# Patient Record
Sex: FEMALE | Race: BLACK OR AFRICAN AMERICAN | NOT HISPANIC OR LATINO | Employment: PART TIME | URBAN - METROPOLITAN AREA
[De-identification: names, ages, dates, MRNs, and addresses within clinical notes are randomized per-mention and may not be internally consistent; named-entity substitution may affect disease eponyms.]

---

## 2018-04-27 ENCOUNTER — HOSPITAL ENCOUNTER (EMERGENCY)
Facility: HOSPITAL | Age: 53
Discharge: HOME | End: 2018-04-27
Attending: EMERGENCY MEDICINE
Payer: COMMERCIAL

## 2018-04-27 VITALS
BODY MASS INDEX: 35.77 KG/M2 | OXYGEN SATURATION: 95 % | HEART RATE: 84 BPM | SYSTOLIC BLOOD PRESSURE: 145 MMHG | DIASTOLIC BLOOD PRESSURE: 78 MMHG | HEIGHT: 68 IN | TEMPERATURE: 99 F | RESPIRATION RATE: 19 BRPM | WEIGHT: 236 LBS

## 2018-04-27 DIAGNOSIS — J02.9 ACUTE PHARYNGITIS, UNSPECIFIED ETIOLOGY: Primary | ICD-10-CM

## 2018-04-27 PROCEDURE — 96374 THER/PROPH/DIAG INJ IV PUSH: CPT

## 2018-04-27 PROCEDURE — 99284 EMERGENCY DEPT VISIT MOD MDM: CPT | Mod: 25

## 2018-04-27 PROCEDURE — S0028 INJECTION, FAMOTIDINE, 20 MG: HCPCS | Performed by: EMERGENCY MEDICINE

## 2018-04-27 PROCEDURE — 63600175 PHARM REV CODE 636 W HCPCS: Performed by: EMERGENCY MEDICINE

## 2018-04-27 PROCEDURE — 25000003 PHARM REV CODE 250: Performed by: EMERGENCY MEDICINE

## 2018-04-27 RX ORDER — PREDNISONE 20 MG/1
60 TABLET ORAL
Status: COMPLETED | OUTPATIENT
Start: 2018-04-27 | End: 2018-04-27

## 2018-04-27 RX ORDER — DIPHENHYDRAMINE HYDROCHLORIDE 50 MG/ML
25 INJECTION INTRAMUSCULAR; INTRAVENOUS
Status: DISCONTINUED | OUTPATIENT
Start: 2018-04-27 | End: 2018-04-27 | Stop reason: HOSPADM

## 2018-04-27 RX ORDER — FAMOTIDINE 10 MG/ML
20 INJECTION INTRAVENOUS
Status: COMPLETED | OUTPATIENT
Start: 2018-04-27 | End: 2018-04-27

## 2018-04-27 RX ORDER — PREDNISONE 20 MG/1
60 TABLET ORAL DAILY
Qty: 9 TABLET | Refills: 0 | Status: SHIPPED | OUTPATIENT
Start: 2018-04-27 | End: 2018-04-30

## 2018-04-27 RX ORDER — FAMOTIDINE 20 MG/1
20 TABLET, FILM COATED ORAL 2 TIMES DAILY
Qty: 10 TABLET | Refills: 0 | Status: SHIPPED | OUTPATIENT
Start: 2018-04-27 | End: 2019-04-27

## 2018-04-27 RX ORDER — DIPHENHYDRAMINE HCL 50 MG
50 CAPSULE ORAL EVERY 6 HOURS PRN
Qty: 20 CAPSULE | Refills: 0 | COMMUNITY
Start: 2018-04-27

## 2018-04-27 RX ADMIN — PREDNISONE 60 MG: 20 TABLET ORAL at 08:04

## 2018-04-27 RX ADMIN — FAMOTIDINE 20 MG: 10 INJECTION INTRAVENOUS at 05:04

## 2018-04-27 NOTE — ED TRIAGE NOTES
Pt was received by EMS and sent over from the urgent care. The pt states she thinks she is having an allergic reaction cilantro. She went to lunch at 1 pm at a Mexican restaurant and has never ate cilantro before today.

## 2018-04-27 NOTE — ED PROVIDER NOTES
"Encounter Date: 2018    SCRIBE #1 NOTE: I, Liz Pedroza, am scribing for, and in the presence of,  Seth Bruno MD. I have scribed the following portions of the note - Other sections scribed: HPI, ROS.       History     Chief Complaint   Patient presents with    Allergic Reaction     pt states when she woke up this am had some scrating in throat, took Singular.  Went to eat lunch and felt SOB w/ wheezing, facial edema, and felt hot.   Went to Urgent Care, was given: 25 mg Benadryl, 125 mg SoluMedrol, 0.3 mg Epi, and 4 mg Zofran.  EMS gave another 25mg Benadryl during trasport.       CC: Possible Allergic Reaction    HPI: 52 year old female smoker presents to the ED via EMS sent from Urgent Care for further evaluation of a possible allergic reaction. Pt reports waking up this morning with an itchy throat, sore throat, throat swelling, cough, and R ear pain. Pt states, "It feels like something is blocking my throat." Pt also reports having nausea and 4 episodes of NBNB emesis since this morning. Pt reports eating Mexican food for lunch and ate cilantro for the first time today. Pt arrived to the ED with IV placed by EMS. Pt was administered solumedrol 125mg, benadryl 25mg, epi 0.3mg, and zofran 4mg at Urgent Care. En route to the ED, pt was administered another benadryl 25mg via EMS. Pt is allergic to keflex. No previous similar hx in the past. No further complaints reported. Pt denies fever, chills, chest pain, wheezing, abdominal pain, dysuria, and rash. No alleviating factors.      The history is provided by the patient. No  was used.     Review of patient's allergies indicates:   Allergen Reactions    Keflex [cephalexin] Hives     History reviewed. No pertinent past medical history.  Past Surgical History:   Procedure Laterality Date     SECTION      reported by the patient    PELVIC LAPAROSCOPY      reported by the patient    TONSILLECTOMY       History reviewed. No pertinent " family history.  Social History   Substance Use Topics    Smoking status: Current Every Day Smoker     Packs/day: 1.00    Smokeless tobacco: Current User    Alcohol use Yes      Comment: socially     Review of Systems   Constitutional: Negative for chills, diaphoresis and fever.   HENT: Positive for ear pain (R) and sore throat.         (+) itchy throat  (+) throat swelling   Eyes: Negative for pain and visual disturbance.   Respiratory: Positive for cough. Negative for shortness of breath and wheezing.    Cardiovascular: Negative for chest pain.   Gastrointestinal: Positive for nausea and vomiting. Negative for abdominal pain and diarrhea.   Genitourinary: Negative for dysuria.   Musculoskeletal: Negative for back pain.   Skin: Negative for rash.   Neurological: Negative for headaches.       Physical Exam     Initial Vitals [04/27/18 1729]   BP Pulse Resp Temp SpO2   (!) 174/109 (!) 111 20 98.8 °F (37.1 °C) 100 %      MAP       130.67         Physical Exam The patient was examined specifically for the following:   General:No significant distress, Good color, Warm and dry. Head and neck:Scalp atraumatic, Neck supple. Neurological:Appropriate conversation, Gross motor deficits. Eyes:Conjugate gaze, Clear corneas. ENT: No epistaxis. Cardiac: Regular rate and rhythm, Grossly normal heart tones. Pulmonary: Wheezing, Rales. Gastrointestinal: Abdominal tenderness, Abdominal distention. Musculoskeletal: Extremity deformity, Apparent pain with range of motion of the joints. Skin: Rash.   The findings on examination were normal except for the following: The patient has a hoarse voice.  There is no respiratory distress.  Lungs are clear.  There is no stridor.  The patient has no wheezing.  The patient is slightly hypertensive.  The heart rate is 111.  The abdomen is nontender the patient has no rash.    ED Course   Procedures  Labs Reviewed - No data to display       Medical decision making: This patient presents to the  emergency room with pain in her throat and swelling in her throat.  There was concern for ALLERGIC reaction.  The patient was treated with IV Solu-Medrol.  I find no urticaria bronchospasm or angioedema in the posterior pharynx.  Patient has no respiratory distress.  ALLERGIC and viral etiologies are considered.  I doubt epiglottitis the patient is not febrile ill or toxic she has no apparent pain with swallowing her own secretions.  There is no stridor.  There is no wheezing.  This patient was observed for more than 4 hours.  She failed to progress.  I will discharge her to follow-up with her primary care doctor in [place] where she is from.  She did have some vomiting earlier.  I'm not sure this was psychogenic or there was some pharyngeal irritation that caused her to become symptomatic.                  Scribe Attestation:   Scribe #1: I performed the above scribed service and the documentation accurately describes the services I performed. I attest to the accuracy of the note.    Attending Attestation:           Physician Attestation for Scribe:  Physician Attestation Statement for Scribe #1: I, Seth Bruno MD, reviewed documentation, as scribed by Liz Pedroza in my presence, and it is both accurate and complete.                    Clinical Impression:   The encounter diagnosis was Acute pharyngitis, unspecified etiology.                           Seth Bruno MD  04/27/18 2022

## 2018-04-27 NOTE — ED NOTES
Pt AAO with RR easy even and full. NAD noted at this time. Pulse ox probe moved to L ear and clear even waveform obtained. Will continue to monitor.

## 2018-04-28 NOTE — DISCHARGE INSTRUCTIONS
Please return immediately if you get worse or if new problems develop.  Please make an appointment to see a primary care doctor this week.  Return if you feel like there is more swelling in her throat.  Return for any difficulty breathing.  Rest.

## 2018-05-14 ENCOUNTER — HOSPITAL ENCOUNTER (EMERGENCY)
Facility: HOSPITAL | Age: 53
Discharge: HOME | End: 2018-05-14
Attending: EMERGENCY MEDICINE
Payer: COMMERCIAL

## 2018-05-14 VITALS
SYSTOLIC BLOOD PRESSURE: 128 MMHG | HEART RATE: 106 BPM | WEIGHT: 241 LBS | BODY MASS INDEX: 36.53 KG/M2 | HEIGHT: 68 IN | TEMPERATURE: 100 F | RESPIRATION RATE: 18 BRPM | DIASTOLIC BLOOD PRESSURE: 73 MMHG | OXYGEN SATURATION: 100 %

## 2018-05-14 DIAGNOSIS — R10.9 ABDOMINAL PAIN: ICD-10-CM

## 2018-05-14 DIAGNOSIS — R11.10 VOMITING, INTRACTABILITY OF VOMITING NOT SPECIFIED, PRESENCE OF NAUSEA NOT SPECIFIED, UNSPECIFIED VOMITING TYPE: Primary | ICD-10-CM

## 2018-05-14 DIAGNOSIS — R19.7 DIARRHEA, UNSPECIFIED TYPE: ICD-10-CM

## 2018-05-14 DIAGNOSIS — R10.84 GENERALIZED ABDOMINAL PAIN: ICD-10-CM

## 2018-05-14 LAB
ALBUMIN SERPL BCP-MCNC: 4 G/DL
ALP SERPL-CCNC: 86 U/L
ALT SERPL W/O P-5'-P-CCNC: 42 U/L
ANION GAP SERPL CALC-SCNC: 11 MMOL/L
AST SERPL-CCNC: 38 U/L
BASOPHILS # BLD AUTO: 0.02 K/UL
BASOPHILS NFR BLD: 0.2 %
BILIRUB SERPL-MCNC: 0.9 MG/DL
BUN SERPL-MCNC: 20 MG/DL
CALCIUM SERPL-MCNC: 9.3 MG/DL
CHLORIDE SERPL-SCNC: 106 MMOL/L
CO2 SERPL-SCNC: 24 MMOL/L
CREAT SERPL-MCNC: 1 MG/DL
DIFFERENTIAL METHOD: ABNORMAL
EOSINOPHIL # BLD AUTO: 0 K/UL
EOSINOPHIL NFR BLD: 0.3 %
ERYTHROCYTE [DISTWIDTH] IN BLOOD BY AUTOMATED COUNT: 13.7 %
EST. GFR  (AFRICAN AMERICAN): >60 ML/MIN/1.73 M^2
EST. GFR  (NON AFRICAN AMERICAN): >60 ML/MIN/1.73 M^2
GLUCOSE SERPL-MCNC: 143 MG/DL
HCT VFR BLD AUTO: 42.3 %
HGB BLD-MCNC: 14.2 G/DL
LYMPHOCYTES # BLD AUTO: 0.9 K/UL
LYMPHOCYTES NFR BLD: 7.6 %
MCH RBC QN AUTO: 28.7 PG
MCHC RBC AUTO-ENTMCNC: 33.6 G/DL
MCV RBC AUTO: 86 FL
MONOCYTES # BLD AUTO: 0.1 K/UL
MONOCYTES NFR BLD: 1.1 %
NEUTROPHILS # BLD AUTO: 11.2 K/UL
NEUTROPHILS NFR BLD: 90.8 %
PLATELET # BLD AUTO: 187 K/UL
PMV BLD AUTO: 10.2 FL
POTASSIUM SERPL-SCNC: 4.4 MMOL/L
PROT SERPL-MCNC: 7.2 G/DL
RBC # BLD AUTO: 4.94 M/UL
SODIUM SERPL-SCNC: 141 MMOL/L
WBC # BLD AUTO: 12.3 K/UL

## 2018-05-14 PROCEDURE — 25000003 PHARM REV CODE 250: Performed by: EMERGENCY MEDICINE

## 2018-05-14 PROCEDURE — 93010 ELECTROCARDIOGRAM REPORT: CPT | Mod: ,,, | Performed by: INTERNAL MEDICINE

## 2018-05-14 PROCEDURE — 85025 COMPLETE CBC W/AUTO DIFF WBC: CPT

## 2018-05-14 PROCEDURE — 99284 EMERGENCY DEPT VISIT MOD MDM: CPT

## 2018-05-14 PROCEDURE — 93005 ELECTROCARDIOGRAM TRACING: CPT

## 2018-05-14 PROCEDURE — 80053 COMPREHEN METABOLIC PANEL: CPT

## 2018-05-14 RX ORDER — MONTELUKAST SODIUM 10 MG/1
10 TABLET ORAL NIGHTLY
COMMUNITY

## 2018-05-14 RX ORDER — ETOMIDATE 2 MG/ML
15 INJECTION INTRAVENOUS
Status: DISCONTINUED | OUTPATIENT
Start: 2018-05-14 | End: 2018-05-14

## 2018-05-14 RX ORDER — OXYMETAZOLINE HCL 0.05 %
2 SPRAY, NON-AEROSOL (ML) NASAL 2 TIMES DAILY
COMMUNITY

## 2018-05-14 RX ORDER — CETIRIZINE HYDROCHLORIDE 10 MG/1
10 TABLET ORAL DAILY
COMMUNITY

## 2018-05-14 RX ORDER — ONDANSETRON 4 MG/1
4 TABLET, ORALLY DISINTEGRATING ORAL
Status: COMPLETED | OUTPATIENT
Start: 2018-05-14 | End: 2018-05-14

## 2018-05-14 RX ORDER — ONDANSETRON 4 MG/1
4 TABLET, FILM COATED ORAL EVERY 6 HOURS
Qty: 12 TABLET | Refills: 0 | Status: SHIPPED | OUTPATIENT
Start: 2018-05-14

## 2018-05-14 RX ORDER — SPIRONOLACTONE 25 MG/1
25 TABLET ORAL DAILY
COMMUNITY

## 2018-05-14 RX ADMIN — ONDANSETRON 4 MG: 4 TABLET, ORALLY DISINTEGRATING ORAL at 02:05

## 2018-05-14 RX ADMIN — SODIUM CHLORIDE 1000 ML: 0.9 INJECTION, SOLUTION INTRAVENOUS at 02:05

## 2018-05-14 RX ADMIN — LIDOCAINE HYDROCHLORIDE: 20 SOLUTION ORAL; TOPICAL at 02:05

## 2018-05-14 NOTE — ED TRIAGE NOTES
Came to the ED today for feeling epigastric pain, N/V/D that started after eating at [employer] around 1900. Pt states the pain is a burning sensation. Pt also reports some weakness. Denies any other complaints at this time.

## 2018-05-15 ENCOUNTER — PES CALL (OUTPATIENT)
Dept: ADMINISTRATIVE | Facility: CLINIC | Age: 53
End: 2018-05-15

## 2024-12-12 NOTE — ED PROVIDER NOTES
"Encounter Date: 2018    SCRIBE #1 NOTE: I, Fe Cordova, am scribing for, and in the presence of,  Benjy Huff MD. I have scribed the following portions of the note - Other sections scribed: ROS, HPI and PE.       History     Chief Complaint   Patient presents with    Abdominal Pain     starting around 1900 when she returned from [facility], also have V/D, states she is feeling weak.    Weakness     CC: Abdominal Pain    HPI: This 52 y.o. female  presents to the ED complaining of acute onset of abdominal pain with associated N/V/D 6 hrs ago PTA. She was recently feeling sick because of her allergies. She saw a doctor for it and was recently started on new medication 4 days ago, including but not limited to Afrin, Nasacort, Singulair and Vitamin D. She also reports drinking "big glass" of Raegan today. Denies any known recent sick exposure with similar GI sx.      The history is provided by the patient. No  was used.     Review of patient's allergies indicates:  No Known Allergies  Past Medical History:   Diagnosis Date    Allergy     Borderline hypertension      Past Surgical History:   Procedure Laterality Date     SECTION      reported by the patient    PELVIC LAPAROSCOPY      reported by the patient    TONSILLECTOMY       History reviewed. No pertinent family history.  Social History   Substance Use Topics    Smoking status: Former Smoker    Smokeless tobacco: Never Used    Alcohol use Yes      Comment: socially     Review of Systems   Constitutional: Negative for chills and fever.   HENT: Negative for congestion, ear pain, rhinorrhea and sore throat.    Eyes: Negative for pain and visual disturbance.   Respiratory: Negative for cough and shortness of breath.    Cardiovascular: Negative for chest pain.   Gastrointestinal: Positive for abdominal pain, diarrhea, nausea and vomiting.   Genitourinary: Negative for dysuria.   Musculoskeletal: Negative for back pain " and neck pain.   Skin: Negative for rash.   Neurological: Negative for headaches.       Physical Exam     Initial Vitals [05/14/18 0118]   BP Pulse Resp Temp SpO2   (!) 141/86 (!) 122 16 98.9 °F (37.2 °C) 99 %      MAP       104.33         Physical Exam    Nursing note and vitals reviewed.  Constitutional: She appears well-developed and well-nourished. She is not diaphoretic. No distress.   The patient has diffused petechiae to her mid face.    HENT:   Head: Normocephalic and atraumatic.   Eyes: EOM are normal.   Neck: Neck supple.   Cardiovascular: Normal rate and regular rhythm.   Pulmonary/Chest: Breath sounds normal. No respiratory distress. She has no wheezes. She has no rhonchi. She has no rales. She exhibits no tenderness.   Abdominal: Soft. Normal appearance and bowel sounds are normal. She exhibits no distension. There is no tenderness. There is no rebound and no guarding.   Musculoskeletal: Normal range of motion. She exhibits no edema.   Neurological: She is alert and oriented to person, place, and time.   Skin: Skin is warm and dry.   Psychiatric: She has a normal mood and affect.         ED Course   Procedures  Labs Reviewed   CBC W/ AUTO DIFFERENTIAL - Abnormal; Notable for the following:        Result Value    Gran # (ANC) 11.2 (*)     Lymph # 0.9 (*)     Mono # 0.1 (*)     Gran% 90.8 (*)     Lymph% 7.6 (*)     Mono% 1.1 (*)     All other components within normal limits   COMPREHENSIVE METABOLIC PANEL - Abnormal; Notable for the following:     Glucose 143 (*)     All other components within normal limits     EKG Readings: (Independently Interpreted)   Rhythm: Sinus Tachycardia. Heart Rate: 108. Conduction: Normal. ST Segments: Normal ST Segments. T Waves: Normal. Axis: Left Axis Deviation. Clinical Impression: Sinus Tachycardia Other Impression: poor rwave progression, min ivcd          Medical Decision Making:   ED Management:  Unclear if sx are due to new rx meds, food poisoning or  gastroenteritis/gastritis. Sx improved in ER, recurred and then resolved.             Scribe Attestation:   Scribe #1: I performed the above scribed service and the documentation accurately describes the services I performed. I attest to the accuracy of the note.    Attending Attestation:           Physician Attestation for Scribe:  Physician Attestation Statement for Scribe #1: I, Benjy Huff MD, reviewed documentation, as scribed by Fe Cordova in my presence, and it is both accurate and complete.                    Clinical Impression:   The primary encounter diagnosis was Vomiting, intractability of vomiting not specified, presence of nausea not specified, unspecified vomiting type. Diagnoses of Abdominal pain, Diarrhea, unspecified type, and Generalized abdominal pain were also pertinent to this visit.                           Benjy Huff MD  05/14/18 1375       Benjy Huff MD  05/31/18 6576     no chest pain and no edema.